# Patient Record
Sex: MALE | Race: WHITE | NOT HISPANIC OR LATINO | Employment: STUDENT | URBAN - METROPOLITAN AREA
[De-identification: names, ages, dates, MRNs, and addresses within clinical notes are randomized per-mention and may not be internally consistent; named-entity substitution may affect disease eponyms.]

---
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
## 2020-07-06 ENCOUNTER — TELEPHONE (OUTPATIENT)
Dept: FAMILY MEDICINE CLINIC | Facility: CLINIC | Age: 20
End: 2020-07-06

## 2020-07-06 NOTE — TELEPHONE ENCOUNTER
PATIENT WOULD LIKE TO KNOW HIS BLOOD TYPE OR FIND OUT HOW TO GET IT. PLEASE CALL HIM BACK @  [phone]

## 2021-05-24 ENCOUNTER — OFFICE VISIT (OUTPATIENT)
Dept: FAMILY MEDICINE CLINIC | Facility: CLINIC | Age: 21
End: 2021-05-24

## 2021-05-24 VITALS
SYSTOLIC BLOOD PRESSURE: 115 MMHG | HEIGHT: 73 IN | HEART RATE: 68 BPM | BODY MASS INDEX: 19.32 KG/M2 | TEMPERATURE: 97.2 F | RESPIRATION RATE: 20 BRPM | DIASTOLIC BLOOD PRESSURE: 70 MMHG | WEIGHT: 145.8 LBS

## 2021-05-24 DIAGNOSIS — Z13.6 ENCOUNTER FOR LIPID SCREENING FOR CARDIOVASCULAR DISEASE: ICD-10-CM

## 2021-05-24 DIAGNOSIS — Z13.220 ENCOUNTER FOR LIPID SCREENING FOR CARDIOVASCULAR DISEASE: ICD-10-CM

## 2021-05-24 DIAGNOSIS — Z11.59 NEED FOR HEPATITIS C SCREENING TEST: ICD-10-CM

## 2021-05-24 DIAGNOSIS — H61.23 BILATERAL IMPACTED CERUMEN: ICD-10-CM

## 2021-05-24 DIAGNOSIS — Z00.00 ENCOUNTER FOR WELL ADULT EXAM WITHOUT ABNORMAL FINDINGS: Primary | ICD-10-CM

## 2021-05-24 DIAGNOSIS — Z23 NEED FOR TETANUS BOOSTER: ICD-10-CM

## 2021-05-24 PROCEDURE — 99395 PREV VISIT EST AGE 18-39: CPT | Performed by: PHYSICIAN ASSISTANT

## 2021-05-24 PROCEDURE — 69209 REMOVE IMPACTED EAR WAX UNI: CPT | Performed by: PHYSICIAN ASSISTANT

## 2021-05-24 PROCEDURE — 90471 IMMUNIZATION ADMIN: CPT | Performed by: PHYSICIAN ASSISTANT

## 2021-05-24 PROCEDURE — 90715 TDAP VACCINE 7 YRS/> IM: CPT | Performed by: PHYSICIAN ASSISTANT

## 2021-05-24 NOTE — PROGRESS NOTES
Chief Complaint   Patient presents with   • Annual Exam     NOTE: pt is NOT fasting today        Subjective   The patient is a 20 y.o. male who presents for annual exam      Nutrition:  well balanced.   Exercise:  walking, PT right now for rehab from knee injury. See below.    Sleep:  getting 8 hours per night     Past Medical History,Medications, Allergies reviewed.     Eye exam UTD   Dental exams are UTD     HPI   Torn R meniscus (medial)  Injury occurred at [employer] practice   Doing PT right now   Had surgery  Dr. Gomez Zacarias in [place]   3 weeks left of season when injury occurred   Pain doing well. Not needing any pain medication   Able to drive   Has one more year of school and being on team     Covid vaccinations completed    3/30 and second 4/20     Due for Tdap today     Wanting to know blood type. Discussed option to have at our office, however usually not covered with insurance. Pt notes he will just donate blood and find out     Father has thyroid issues. Would like to come back for fasting labs later this week     Review of Systems   Constitutional: Negative.  Negative for chills, diaphoresis, fatigue and fever.   HENT: Negative.  Negative for congestion, ear discharge, ear pain, hearing loss, nosebleeds, postnasal drip, sinus pressure, sneezing and sore throat.    Eyes: Negative.    Respiratory: Negative.  Negative for cough, chest tightness, shortness of breath and wheezing.    Cardiovascular: Negative.  Negative for chest pain, palpitations and leg swelling.   Gastrointestinal: Negative for abdominal distention, abdominal pain, blood in stool, constipation, diarrhea, nausea and vomiting.   Genitourinary: Negative.  Negative for difficulty urinating, dysuria, flank pain, frequency, hematuria and urgency.   Musculoskeletal:        As noted per HPI   Skin: Negative.  Negative for color change, pallor, rash and wound.   Neurological: Negative for dizziness, syncope, weakness, light-headedness,  "numbness and headaches.       Objective     /70   Pulse 68   Temp 97.2 °F (36.2 °C)   Resp 20   Ht 185.4 cm (73\")   Wt 66.1 kg (145 lb 12.8 oz)   BMI 19.24 kg/m²     Physical Exam  Vitals and nursing note reviewed.   Constitutional:       Appearance: Normal appearance. He is well-developed.   HENT:      Head: Normocephalic and atraumatic.      Right Ear: Tympanic membrane, ear canal and external ear normal.      Left Ear: Tympanic membrane, ear canal and external ear normal.      Nose: Nose normal.      Mouth/Throat:      Pharynx: No oropharyngeal exudate.   Eyes:      Conjunctiva/sclera: Conjunctivae normal.   Neck:      Thyroid: No thyromegaly.      Trachea: No tracheal deviation.   Cardiovascular:      Rate and Rhythm: Normal rate and regular rhythm.      Heart sounds: Normal heart sounds.   Pulmonary:      Effort: Pulmonary effort is normal. No respiratory distress.      Breath sounds: Normal breath sounds. No wheezing or rales.   Chest:      Chest wall: No tenderness.   Abdominal:      General: Bowel sounds are normal. There is no distension.      Palpations: Abdomen is soft. There is no mass.      Tenderness: There is no abdominal tenderness. There is no guarding or rebound.      Hernia: No hernia is present.   Musculoskeletal:      Cervical back: Normal range of motion and neck supple.      Comments: Right knee in brace    Lymphadenopathy:      Cervical: No cervical adenopathy.   Skin:     General: Skin is warm and dry.   Neurological:      Mental Status: He is alert and oriented to person, place, and time.   Psychiatric:         Behavior: Behavior normal.         Thought Content: Thought content normal.         Judgment: Judgment normal.       Ear Cerumen Removal    Date/Time: 5/24/2021 8:28 AM  Performed by: Juan Hills PA  Authorized by: Juan Hills PA   Consent: Verbal consent obtained.  Risks and benefits: risks, benefits and alternatives were discussed  Consent given by: " patient  Patient understanding: patient states understanding of the procedure being performed  Patient consent: the patient's understanding of the procedure matches consent given  Procedure consent: procedure consent matches procedure scheduled  Location: both ears.  Patient tolerance: Patient tolerated the procedure well with no immediate complications  Procedure type: irrigation   Sedation:  Patient sedated: no              Assessment/Plan     1. Encounter for well adult exam without abnormal findings  - Tdap Vaccine Greater Than or Equal To 6yo IM  - CBC w AUTO Differential  - Comprehensive metabolic panel  - TSH  - Lipid Panel  - Hepatitis C antibody    2. Need for tetanus booster  - Tdap Vaccine Greater Than or Equal To 6yo IM    3. Encounter for lipid screening for cardiovascular disease  - Lipid Panel    4. Need for hepatitis C screening test  - Hepatitis C antibody    5. Bilateral impacted cerumen  - Ear Cerumen Removal    Successful irrigation of ears bilaterally. Pt tolerated well   TDAP updated with patient's consent   Return for fasting labs    Counseling was given to patient for the following topics: instructions for management, risk factor reductions, patient and family education and impressions . Total time of the encounter was 30 minutes and 10 minutes was spent counseling.      CECILIA Barrera

## 2021-05-26 ENCOUNTER — LAB (OUTPATIENT)
Dept: FAMILY MEDICINE CLINIC | Facility: CLINIC | Age: 21
End: 2021-05-26

## 2021-05-27 LAB
ALBUMIN SERPL-MCNC: 4.7 G/DL (ref 3.5–5.2)
ALBUMIN/GLOB SERPL: 2.6 G/DL
ALP SERPL-CCNC: 80 U/L (ref 39–117)
ALT SERPL-CCNC: 17 U/L (ref 1–41)
AST SERPL-CCNC: 15 U/L (ref 1–40)
BASOPHILS # BLD AUTO: 0.03 10*3/MM3 (ref 0–0.2)
BASOPHILS NFR BLD AUTO: 0.6 % (ref 0–1.5)
BILIRUB SERPL-MCNC: 0.5 MG/DL (ref 0–1.2)
BUN SERPL-MCNC: 14 MG/DL (ref 6–20)
BUN/CREAT SERPL: 16.7 (ref 7–25)
CALCIUM SERPL-MCNC: 10.1 MG/DL (ref 8.6–10.5)
CHLORIDE SERPL-SCNC: 101 MMOL/L (ref 98–107)
CHOLEST SERPL-MCNC: 170 MG/DL (ref 0–200)
CO2 SERPL-SCNC: 28.4 MMOL/L (ref 22–29)
CREAT SERPL-MCNC: 0.84 MG/DL (ref 0.76–1.27)
EOSINOPHIL # BLD AUTO: 0.21 10*3/MM3 (ref 0–0.4)
EOSINOPHIL NFR BLD AUTO: 4.5 % (ref 0.3–6.2)
ERYTHROCYTE [DISTWIDTH] IN BLOOD BY AUTOMATED COUNT: 12.6 % (ref 12.3–15.4)
GLOBULIN SER CALC-MCNC: 1.8 GM/DL
GLUCOSE SERPL-MCNC: 76 MG/DL (ref 65–99)
HCT VFR BLD AUTO: 49.9 % (ref 37.5–51)
HCV AB S/CO SERPL IA: <0.1 S/CO RATIO (ref 0–0.9)
HDLC SERPL-MCNC: 40 MG/DL (ref 40–60)
HGB BLD-MCNC: 16.4 G/DL (ref 13–17.7)
IMM GRANULOCYTES # BLD AUTO: 0 10*3/MM3 (ref 0–0.05)
IMM GRANULOCYTES NFR BLD AUTO: 0 % (ref 0–0.5)
LDLC SERPL CALC-MCNC: 110 MG/DL (ref 0–100)
LYMPHOCYTES # BLD AUTO: 1.62 10*3/MM3 (ref 0.7–3.1)
LYMPHOCYTES NFR BLD AUTO: 34.5 % (ref 19.6–45.3)
MCH RBC QN AUTO: 29.4 PG (ref 26.6–33)
MCHC RBC AUTO-ENTMCNC: 32.9 G/DL (ref 31.5–35.7)
MCV RBC AUTO: 89.6 FL (ref 79–97)
MONOCYTES # BLD AUTO: 0.35 10*3/MM3 (ref 0.1–0.9)
MONOCYTES NFR BLD AUTO: 7.5 % (ref 5–12)
NEUTROPHILS # BLD AUTO: 2.48 10*3/MM3 (ref 1.7–7)
NEUTROPHILS NFR BLD AUTO: 52.9 % (ref 42.7–76)
NRBC BLD AUTO-RTO: 0 /100 WBC (ref 0–0.2)
PLATELET # BLD AUTO: 188 10*3/MM3 (ref 140–450)
POTASSIUM SERPL-SCNC: 4.2 MMOL/L (ref 3.5–5.2)
PROT SERPL-MCNC: 6.5 G/DL (ref 6–8.5)
RBC # BLD AUTO: 5.57 10*6/MM3 (ref 4.14–5.8)
SODIUM SERPL-SCNC: 141 MMOL/L (ref 136–145)
TRIGL SERPL-MCNC: 111 MG/DL (ref 0–150)
TSH SERPL DL<=0.005 MIU/L-ACNC: 2.77 UIU/ML (ref 0.27–4.2)
VLDLC SERPL CALC-MCNC: 20 MG/DL (ref 5–40)
WBC # BLD AUTO: 4.69 10*3/MM3 (ref 3.4–10.8)

## 2021-06-10 ENCOUNTER — TELEPHONE (OUTPATIENT)
Dept: FAMILY MEDICINE CLINIC | Facility: CLINIC | Age: 21
End: 2021-06-10

## 2021-06-10 NOTE — TELEPHONE ENCOUNTER
Caller: Lane Urbano    Relationship: Self    Best call back number: [phone]    What form or medical record are you requesting: IMMUNIZATION RECORDS    Who is requesting this form or medical record from you: PATIENT    How would you like to receive the form or medical records (pick-up, mail, fax): Submittable PORTAL PREFERABLY.  If fax, what is the fax number:   If mail, what is the address:  If pick-up, provide patient with address and location details    Timeframe paperwork needed: AS SOON AS POSSIBLE.      Additional notes: PATIENT STATED HE IS NEEDING A COPY OF HIS IMMUNIZATION RECORDS VOLUNTEER OBSERVATION AT [facility].    PATIENT REQUESTING IF IT IS POSSIBLE TO RECEIVE A COPY THROUGH HIS Submittable PORTAL, IF UNABLE TO DO SO PATIENT WOULD BE AVAILABLE TO  AT CLINIC TOMORROW, 6/11.

## 2021-06-11 DIAGNOSIS — Z23 NEED FOR TUBERCULOSIS VACCINATION: Primary | ICD-10-CM

## 2021-06-11 PROCEDURE — 86580 TB INTRADERMAL TEST: CPT | Performed by: PHYSICIAN ASSISTANT

## 2021-06-14 LAB
INDURATION: 0 MM (ref 0–10)
Lab: NORMAL
Lab: NORMAL
TB SKIN TEST: NEGATIVE

## 2022-05-25 ENCOUNTER — TELEPHONE (OUTPATIENT)
Dept: FAMILY MEDICINE CLINIC | Facility: CLINIC | Age: 22
End: 2022-05-25

## 2022-05-25 ENCOUNTER — OFFICE VISIT (OUTPATIENT)
Dept: FAMILY MEDICINE CLINIC | Facility: CLINIC | Age: 22
End: 2022-05-25

## 2022-05-25 VITALS
HEART RATE: 58 BPM | OXYGEN SATURATION: 99 % | SYSTOLIC BLOOD PRESSURE: 98 MMHG | DIASTOLIC BLOOD PRESSURE: 66 MMHG | WEIGHT: 147.5 LBS | TEMPERATURE: 97.5 F | RESPIRATION RATE: 18 BRPM | HEIGHT: 73 IN | BODY MASS INDEX: 19.55 KG/M2

## 2022-05-25 DIAGNOSIS — E55.9 VITAMIN D DEFICIENCY: ICD-10-CM

## 2022-05-25 DIAGNOSIS — Z11.1 SCREENING FOR TUBERCULOSIS: ICD-10-CM

## 2022-05-25 DIAGNOSIS — Z13.6 ENCOUNTER FOR LIPID SCREENING FOR CARDIOVASCULAR DISEASE: ICD-10-CM

## 2022-05-25 DIAGNOSIS — Z00.00 ENCOUNTER FOR WELL ADULT EXAM WITHOUT ABNORMAL FINDINGS: Primary | ICD-10-CM

## 2022-05-25 DIAGNOSIS — Z13.220 ENCOUNTER FOR LIPID SCREENING FOR CARDIOVASCULAR DISEASE: ICD-10-CM

## 2022-05-25 DIAGNOSIS — Z01.84 IMMUNITY STATUS TESTING: ICD-10-CM

## 2022-05-25 PROCEDURE — 99395 PREV VISIT EST AGE 18-39: CPT | Performed by: PHYSICIAN ASSISTANT

## 2022-05-25 NOTE — PROGRESS NOTES
"Chief Complaint   Patient presents with   • Annual Exam     [place]       Subjective   The patient is a 21 y.o. male who presents for annual exam      Nutrition:  well balanced   Exercise:  regular       Past Medical History,Medications, Allergies reviewed.     HPI  Pt presents for annual exam   Needs labs to check immunity status for Hep B, varicella, and MMR. Also needs screening for Hep C   Plans to attend a one year masters program in [place].   Pt is excited for this opportunity     Review of Systems   Constitutional: Negative.  Negative for chills, diaphoresis, fatigue and fever.   HENT: Negative.  Negative for congestion, ear discharge, ear pain, hearing loss, nosebleeds, postnasal drip, sinus pressure, sneezing and sore throat.    Eyes: Negative.    Respiratory: Negative.  Negative for cough, chest tightness, shortness of breath and wheezing.    Cardiovascular: Negative.  Negative for chest pain, palpitations and leg swelling.   Gastrointestinal: Negative for abdominal distention, abdominal pain, blood in stool, constipation, diarrhea, nausea and vomiting.   Genitourinary: Negative.  Negative for difficulty urinating, dysuria, flank pain, frequency, hematuria and urgency.   Musculoskeletal: Negative.  Negative for arthralgias, back pain, gait problem, joint swelling, myalgias, neck pain and neck stiffness.   Skin: Negative.  Negative for color change, pallor, rash and wound.   Neurological: Negative for dizziness, syncope, weakness, light-headedness, numbness and headaches.       Objective     BP 98/66   Pulse 58   Temp 97.5 °F (36.4 °C)   Resp 18   Ht 185.4 cm (73\")   Wt 66.9 kg (147 lb 8 oz)   SpO2 99%   BMI 19.46 kg/m²     Physical Exam  Vitals and nursing note reviewed.   Constitutional:       Appearance: Normal appearance. He is well-developed.   HENT:      Head: Normocephalic and atraumatic.      Right Ear: Tympanic membrane, ear canal and external ear normal. "      Left Ear: Tympanic membrane, ear canal and external ear normal.      Nose: Nose normal.      Mouth/Throat:      Pharynx: No oropharyngeal exudate.   Eyes:      Conjunctiva/sclera: Conjunctivae normal.   Neck:      Thyroid: No thyromegaly.      Trachea: No tracheal deviation.   Cardiovascular:      Rate and Rhythm: Normal rate and regular rhythm.      Heart sounds: Normal heart sounds.   Pulmonary:      Effort: Pulmonary effort is normal. No respiratory distress.      Breath sounds: Normal breath sounds. No wheezing or rales.   Chest:      Chest wall: No tenderness.   Abdominal:      General: Bowel sounds are normal. There is no distension.      Palpations: Abdomen is soft. There is no mass.      Tenderness: There is no abdominal tenderness. There is no guarding or rebound.      Hernia: No hernia is present.   Musculoskeletal:      Cervical back: Normal range of motion and neck supple.   Lymphadenopathy:      Cervical: No cervical adenopathy.   Skin:     General: Skin is warm and dry.   Neurological:      Mental Status: He is alert and oriented to person, place, and time.   Psychiatric:         Mood and Affect: Mood normal.         Behavior: Behavior normal.         Thought Content: Thought content normal.         Judgment: Judgment normal.         Assessment & Plan     1. Encounter for well adult exam without abnormal findings  - Hepatitis B surface antigen  - Hepatitis B Core Antibody, Total  - Hepatitis C antibody  - Hepatitis B Core Antibody, IgM  - QuantiFERON TB Gold  - Varicella Zoster Antibodies, IgG / IgM  - Measles / Mumps / Rubella Immunity  - CBC w AUTO Differential  - Comprehensive metabolic panel  - Lipid Panel  - TSH  - Vitamin D 25 hydroxy    2. Immunity status testing  - Hepatitis B surface antigen  - Hepatitis B Core Antibody, Total  - Hepatitis C antibody  - Hepatitis B Core Antibody, IgM  - Varicella Zoster Antibodies, IgG / IgM  - Measles / Mumps / Rubella Immunity  - Hepatitis B Surface  Antibody    3. Screening for tuberculosis  - QuantiFERON TB Gold    4. Vitamin D deficiency  - Vitamin D 25 hydroxy    5. Encounter for lipid screening for cardiovascular disease  - Lipid Panel      Labs as outlined in plan per [facility] requirements.   Pt will contact insurance before returning for labs to check on expense.   Updated immunization certificate provided     Counseling was given to patient for the following topics: instructions for management, risk factor reductions, patient and family education and impressions . Total time of the encounter was 20 minutes and 10 minutes was spent counseling.    CECILIA López

## 2022-05-25 NOTE — TELEPHONE ENCOUNTER
Caller: Lane Urbano    Relationship: Self    Best call back number: [phone]    What is the best time to reach you:     Who are you requesting to speak with (clinical staff, provider,  specific staff member):     Do you know the name of the person who called:     What was the call regarding:  PT WILL LIKE THE PROCEDURE CODES FOR TEST THAT MRS. GRAY WILL ORDER SO THAT HE CAN CHECK WITH INSURANCE TO MAKE SURE IT IS COVERED    Do you require a callback:

## 2022-06-07 ENCOUNTER — TELEPHONE (OUTPATIENT)
Dept: FAMILY MEDICINE CLINIC | Facility: CLINIC | Age: 22
End: 2022-06-07

## 2022-06-07 NOTE — TELEPHONE ENCOUNTER
Please call patient and see what he is deciding to do about labs required by his study abroad program. Still have his forms in my office.

## 2022-06-18 LAB
25(OH)D3+25(OH)D2 SERPL-MCNC: 27.3 NG/ML (ref 30–100)
ALBUMIN SERPL-MCNC: 4.6 G/DL (ref 4.1–5.2)
ALBUMIN/GLOB SERPL: 1.9 {RATIO} (ref 1.2–2.2)
ALP SERPL-CCNC: 73 IU/L (ref 44–121)
ALT SERPL-CCNC: 17 IU/L (ref 0–44)
AST SERPL-CCNC: 21 IU/L (ref 0–40)
BASOPHILS # BLD AUTO: 0 X10E3/UL (ref 0–0.2)
BASOPHILS NFR BLD AUTO: 1 %
BILIRUB SERPL-MCNC: 0.9 MG/DL (ref 0–1.2)
BUN SERPL-MCNC: 14 MG/DL (ref 6–20)
BUN/CREAT SERPL: 12 (ref 9–20)
CALCIUM SERPL-MCNC: 9.8 MG/DL (ref 8.7–10.2)
CHLORIDE SERPL-SCNC: 100 MMOL/L (ref 96–106)
CHOLEST SERPL-MCNC: 159 MG/DL (ref 100–199)
CO2 SERPL-SCNC: 23 MMOL/L (ref 20–29)
CREAT SERPL-MCNC: 1.13 MG/DL (ref 0.76–1.27)
EGFRCR SERPLBLD CKD-EPI 2021: 94 ML/MIN/1.73
EOSINOPHIL # BLD AUTO: 0.1 X10E3/UL (ref 0–0.4)
EOSINOPHIL NFR BLD AUTO: 4 %
ERYTHROCYTE [DISTWIDTH] IN BLOOD BY AUTOMATED COUNT: 12.7 % (ref 11.6–15.4)
GAMMA INTERFERON BACKGROUND BLD IA-ACNC: 0.38 IU/ML
GLOBULIN SER CALC-MCNC: 2.4 G/DL (ref 1.5–4.5)
GLUCOSE SERPL-MCNC: 85 MG/DL (ref 65–99)
HBV CORE AB SERPL QL IA: NEGATIVE
HBV CORE IGM SERPL QL IA: NEGATIVE
HBV SURFACE AG SERPL QL IA: NEGATIVE
HCT VFR BLD AUTO: 50.9 % (ref 37.5–51)
HCV AB S/CO SERPL IA: 0.2 S/CO RATIO (ref 0–0.9)
HDLC SERPL-MCNC: 44 MG/DL
HGB BLD-MCNC: 17.2 G/DL (ref 13–17.7)
IMM GRANULOCYTES # BLD AUTO: 0 X10E3/UL (ref 0–0.1)
IMM GRANULOCYTES NFR BLD AUTO: 0 %
LDLC SERPL CALC-MCNC: 102 MG/DL (ref 0–99)
LYMPHOCYTES # BLD AUTO: 1.4 X10E3/UL (ref 0.7–3.1)
LYMPHOCYTES NFR BLD AUTO: 38 %
M TB IFN-G BLD-IMP: NEGATIVE
M TB IFN-G CD4+ BCKGRND COR BLD-ACNC: 0.04 IU/ML
M TB IFN-G CD4+CD8+ BCKGRND COR BLD-ACNC: 0.03 IU/ML
MCH RBC QN AUTO: 30.4 PG (ref 26.6–33)
MCHC RBC AUTO-ENTMCNC: 33.8 G/DL (ref 31.5–35.7)
MCV RBC AUTO: 90 FL (ref 79–97)
MEV IGG SER IA-ACNC: 241 AU/ML
MITOGEN IGNF BLD-ACNC: >10 IU/ML
MONOCYTES # BLD AUTO: 0.3 X10E3/UL (ref 0.1–0.9)
MONOCYTES NFR BLD AUTO: 8 %
MUV IGG SER IA-ACNC: 78.5 AU/ML
NEUTROPHILS # BLD AUTO: 1.9 X10E3/UL (ref 1.4–7)
NEUTROPHILS NFR BLD AUTO: 49 %
PLATELET # BLD AUTO: 185 X10E3/UL (ref 150–450)
POTASSIUM SERPL-SCNC: 4.6 MMOL/L (ref 3.5–5.2)
PROT SERPL-MCNC: 7 G/DL (ref 6–8.5)
QUANTIFERON INCUBATION: NORMAL
RBC # BLD AUTO: 5.65 X10E6/UL (ref 4.14–5.8)
RUBV IGG SERPL IA-ACNC: 2.88 INDEX
SERVICE CMNT-IMP: NORMAL
SODIUM SERPL-SCNC: 140 MMOL/L (ref 134–144)
TRIGL SERPL-MCNC: 68 MG/DL (ref 0–149)
TSH SERPL DL<=0.005 MIU/L-ACNC: 1.99 UIU/ML (ref 0.45–4.5)
VLDLC SERPL CALC-MCNC: 13 MG/DL (ref 5–40)
VZV IGG SER IA-ACNC: 301 INDEX
VZV IGM SER IA-ACNC: <0.91 INDEX (ref 0–0.9)
WBC # BLD AUTO: 3.7 X10E3/UL (ref 3.4–10.8)

## 2022-06-21 LAB
HBV SURFACE AB SER QL: NON REACTIVE
Lab: NORMAL
WRITTEN AUTHORIZATION: NORMAL

## 2022-06-22 DIAGNOSIS — Z01.84 LACK OF IMMUNITY TO HEPATITIS B VIRUS DEMONSTRATED BY SEROLOGIC TEST: Primary | ICD-10-CM

## 2022-07-26 ENCOUNTER — TELEPHONE (OUTPATIENT)
Dept: FAMILY MEDICINE CLINIC | Facility: CLINIC | Age: 22
End: 2022-07-26

## 2022-07-27 ENCOUNTER — CLINICAL SUPPORT (OUTPATIENT)
Dept: FAMILY MEDICINE CLINIC | Facility: CLINIC | Age: 22
End: 2022-07-27

## 2022-07-27 DIAGNOSIS — Z23 NEED FOR HEPATITIS B BOOSTER VACCINATION: Primary | ICD-10-CM

## 2023-07-27 ENCOUNTER — OFFICE VISIT (OUTPATIENT)
Dept: FAMILY MEDICINE CLINIC | Facility: CLINIC | Age: 23
End: 2023-07-27
Payer: COMMERCIAL

## 2023-07-27 VITALS
DIASTOLIC BLOOD PRESSURE: 64 MMHG | HEART RATE: 67 BPM | TEMPERATURE: 97.7 F | OXYGEN SATURATION: 98 % | WEIGHT: 147 LBS | SYSTOLIC BLOOD PRESSURE: 112 MMHG | HEIGHT: 73 IN | RESPIRATION RATE: 16 BRPM | BODY MASS INDEX: 19.48 KG/M2

## 2023-07-27 DIAGNOSIS — Z00.00 ENCOUNTER FOR WELL ADULT EXAM WITHOUT ABNORMAL FINDINGS: Primary | ICD-10-CM

## 2023-07-27 DIAGNOSIS — D22.9 SUSPICIOUS NEVUS: ICD-10-CM

## 2023-07-27 PROCEDURE — 99395 PREV VISIT EST AGE 18-39: CPT | Performed by: PHYSICIAN ASSISTANT

## 2023-07-27 NOTE — PROGRESS NOTES
"Chief Complaint   Patient presents with    Annual Exam     Physical-spot on back       Subjective   The patient is a 22 y.o. male who presents for annual exam      Nutrition:  well balanced. More plant based diet now. 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 Stays active. Enjoyed traveling around to different areas with team   Sleep:  doing well no concerns     Past Medical History,Medications, Allergies reviewed.     HPI  Pt presents for annual exam   Recently got back from one year master's program in [place]. This was a great experience for patient   Plans to go back for a couple weeks to finish up everything with thesis and then move back to [place]    Monkey pox vaccination completed in [place].     PrEP therapy -Event based dosing while overseas. Interested in possibly doing this again when her fully moves back to [place]  History of male sexual partner(s) and uses protection with condoms   Denies any STD testing at this time but in the future would like to proceed with screening every three months.     Dental appointment and eye exam scheduled     Has irregular mole on back he would like checked. Does not have established dermatologist     Objective     /64   Pulse 67   Temp 97.7 °F (36.5 °C)   Resp 16   Ht 185.4 cm (73\")   Wt 66.7 kg (147 lb)   SpO2 98%   BMI 19.39 kg/m²     Physical Exam  Vitals and nursing note reviewed.   Constitutional:       Appearance: Normal appearance.   HENT:      Head: Normocephalic.      Right Ear: Tympanic membrane, ear canal and external ear normal.      Left Ear: Tympanic membrane, ear canal and external ear normal.      Nose: Nose normal.      Mouth/Throat:      Pharynx: No oropharyngeal exudate.   Eyes:      Conjunctiva/sclera: Conjunctivae normal.   Cardiovascular:      Rate and Rhythm: Normal rate and regular rhythm.   Pulmonary:      Effort: Pulmonary effort is normal.      Breath sounds: Normal breath " sounds.   Abdominal:      General: Abdomen is flat. Bowel sounds are normal.      Palpations: Abdomen is soft. There is no mass.      Tenderness: There is no abdominal tenderness. There is no guarding.   Skin:     General: Skin is warm and dry.   Neurological:      General: No focal deficit present.      Mental Status: He is alert and oriented to person, place, and time.   Psychiatric:         Mood and Affect: Mood normal.         Behavior: Behavior normal.     Large irregular pigmented lesion of L upper back as imaged above     Assessment & Plan     1. Encounter for well adult exam without abnormal findings    2. Suspicious nevus  - Ambulatory Referral to Dermatology    Referral to dermatology for suspicious nevus   Pt will notify office when wanting to start back with PrEP therapy and will place referral to ID   Denies STD screening at this time. Will obtain when he goes back to [place] to obtain for free. [facility] would be the most affordable option to obtain screening on a 3 month basis.     Counseling was given to patient for the following topics: instructions for management, risk factor reductions, patient and family education, and impressions . Total time of the encounter was 15 minutes and 7 minutes was spent counseling.      CECILIA López

## 2023-09-01 ENCOUNTER — TELEPHONE (OUTPATIENT)
Dept: FAMILY MEDICINE CLINIC | Facility: CLINIC | Age: 23
End: 2023-09-01
Payer: COMMERCIAL

## 2023-09-01 NOTE — TELEPHONE ENCOUNTER
Caller: Lane Urbano    Relationship: Self    Best call back number: [phone]      What test was performed:STI    When was the test performed: 2 WEEKS AGO    Where was the test performed: [facility]

## 2023-09-06 ENCOUNTER — OFFICE VISIT (OUTPATIENT)
Dept: FAMILY MEDICINE CLINIC | Facility: CLINIC | Age: 23
End: 2023-09-06
Payer: COMMERCIAL

## 2023-09-06 VITALS
OXYGEN SATURATION: 99 % | TEMPERATURE: 98.6 F | DIASTOLIC BLOOD PRESSURE: 66 MMHG | BODY MASS INDEX: 19.06 KG/M2 | HEIGHT: 73 IN | HEART RATE: 71 BPM | SYSTOLIC BLOOD PRESSURE: 96 MMHG | WEIGHT: 143.8 LBS | RESPIRATION RATE: 16 BRPM

## 2023-09-06 DIAGNOSIS — A54.9 GONORRHEA: Primary | ICD-10-CM

## 2023-09-06 RX ORDER — CEFTRIAXONE 500 MG/1
500 INJECTION, POWDER, FOR SOLUTION INTRAMUSCULAR; INTRAVENOUS ONCE
Status: COMPLETED | OUTPATIENT
Start: 2023-09-06 | End: 2023-09-06

## 2023-09-06 RX ORDER — DOXYCYCLINE HYCLATE 100 MG/1
100 CAPSULE ORAL 2 TIMES DAILY
Qty: 20 CAPSULE | Refills: 0 | Status: SHIPPED | OUTPATIENT
Start: 2023-09-06

## 2023-09-06 RX ADMIN — CEFTRIAXONE 500 MG: 500 INJECTION, POWDER, FOR SOLUTION INTRAMUSCULAR; INTRAVENOUS at 11:21

## 2023-09-06 NOTE — PROGRESS NOTES
"Subjective   Lane Urbano is a 22 y.o. male.     History of Present Illness   Pt received STD screening while in [place]   Recently tested positive for rectal gonorrhea. Has been having symptoms of rectal discomfort, trouble initiating BM, diarrhea and mild blood when wiping   Suspected contact was a week prior, did use condoms   All other STD testing was negative   Called health department but they could not get him in for a week   No fever, chills or urinary symptoms     The following portions of the patient's history were reviewed and updated as appropriate: allergies, current medications, past family history, past medical history, past social history, past surgical history, and problem list.    Review of Systems  As noted per HPI     Objective   Blood pressure 96/66, pulse 71, temperature 98.6 °F (37 °C), resp. rate 16, height 185.4 cm (73\"), weight 65.2 kg (143 lb 12.8 oz), SpO2 99 %.   Physical Exam  Vitals reviewed.   Cardiovascular:      Rate and Rhythm: Normal rate.   Pulmonary:      Effort: Pulmonary effort is normal.   Neurological:      Mental Status: He is alert.   Psychiatric:         Mood and Affect: Mood normal.         Behavior: Behavior normal.       Assessment & Plan   Diagnoses and all orders for this visit:    1. Gonorrhea (Primary)  -     cefTRIAXone (ROCEPHIN) injection 500 mg  -     doxycycline (VIBRAMYCIN) 100 MG capsule; Take 1 capsule by mouth 2 (Two) Times a Day.  Dispense: 20 capsule; Refill: 0    Rocephin administered in office with patient's consent to treat for gonorrhea infection. Monitored X 15 minutes without reaction   Will also cover with oral doxycycline as noted.   Encourage retesting at health department 2 weeks after completing treatment   Abstain from sexual intercourse until confirmed infection has cleared. Pt voiced understanding              "

## 2023-12-19 DIAGNOSIS — K62.89 ANAL INFECTION: Primary | ICD-10-CM

## 2024-03-27 ENCOUNTER — OFFICE VISIT (OUTPATIENT)
Dept: FAMILY MEDICINE CLINIC | Facility: CLINIC | Age: 24
End: 2024-03-27
Payer: COMMERCIAL

## 2024-03-27 VITALS
DIASTOLIC BLOOD PRESSURE: 64 MMHG | TEMPERATURE: 98.4 F | SYSTOLIC BLOOD PRESSURE: 108 MMHG | HEIGHT: 73 IN | WEIGHT: 150.4 LBS | RESPIRATION RATE: 15 BRPM | BODY MASS INDEX: 19.93 KG/M2 | OXYGEN SATURATION: 99 % | HEART RATE: 75 BPM

## 2024-03-27 DIAGNOSIS — Z72.52 HIGH RISK HOMOSEXUAL BEHAVIOR: ICD-10-CM

## 2024-03-27 DIAGNOSIS — H00.031 CELLULITIS OF RIGHT UPPER EYELID: Primary | ICD-10-CM

## 2024-03-27 PROCEDURE — 99213 OFFICE O/P EST LOW 20 MIN: CPT | Performed by: PHYSICIAN ASSISTANT

## 2024-03-27 RX ORDER — PREDNISONE 10 MG/1
TABLET ORAL
Qty: 21 EACH | Refills: 0 | Status: SHIPPED | OUTPATIENT
Start: 2024-03-27 | End: 2024-04-04

## 2024-03-27 RX ORDER — SULFAMETHOXAZOLE AND TRIMETHOPRIM 800; 160 MG/1; MG/1
1 TABLET ORAL 2 TIMES DAILY
Qty: 14 TABLET | Refills: 0 | Status: SHIPPED | OUTPATIENT
Start: 2024-03-27 | End: 2024-04-04

## 2024-03-27 NOTE — PROGRESS NOTES
"Subjective   Lane Urbano is a 23 y.o. male.   Chief Complaint   Patient presents with    Eye Problem     Rt eye swollen-woke up swollen and has gotten darker in color-no drainage or crustiness-tender to touch      History of Present Illness   Patient woke up with swelling of R upper eyelid   Starting to feel tight and tender   Red in color   No suspected stye and no lump palpated  No change in vision or redness of eye itself   No drainage   No known injury     Patient is requesting referral for PrEP treatment   UTD on STD screening from outside clinic  Homosexual male with male partners   Uses protection   No current symptoms     The following portions of the patient's history were reviewed and updated as appropriate: allergies, current medications, past family history, past medical history, past social history, past surgical history, and problem list.    Review of Systems  As noted per HPI     Objective   Blood pressure 108/64, pulse 75, temperature 98.4 °F (36.9 °C), resp. rate 15, height 185.4 cm (73\"), weight 68.2 kg (150 lb 6.4 oz), SpO2 99%.     Physical Exam  Constitutional:       Appearance: Normal appearance.   HENT:      Head: Normocephalic.      Right Ear: Tympanic membrane, ear canal and external ear normal.      Left Ear: Tympanic membrane, ear canal and external ear normal.      Nose: Nose normal.      Mouth/Throat:      Pharynx: No oropharyngeal exudate or posterior oropharyngeal erythema.   Eyes:      Conjunctiva/sclera: Conjunctivae normal.   Cardiovascular:      Rate and Rhythm: Normal rate and regular rhythm.   Pulmonary:      Effort: Pulmonary effort is normal.      Breath sounds: Normal breath sounds.   Skin:     General: Skin is warm and dry.   Neurological:      Mental Status: He is alert and oriented to person, place, and time.   Psychiatric:         Mood and Affect: Mood normal.         Behavior: Behavior normal.           Erythema and edema of R upper eyelid as imaged above. No active " drainage. Mild TTP. No hordeolum or localized mass palpated     Assessment & Plan   Diagnoses and all orders for this visit:    1. Cellulitis of right upper eyelid (Primary)  -      sulfamethoxazole-trimethoprim (Bactrim DS) 800-160 MG per tablet; Take 1 tablet by mouth 2 (Two) Times a Day. (Patient not taking: Reported on 4/4/2024)  Dispense: 14 tablet; Refill: 0  -     predniSONE (DELTASONE) 10 MG (21) dose pack; Use as directed on package  Dispense: 21 each; Refill: 0    2. High risk homosexual behavior  -     Ambulatory Referral to Infectious Disease      Symptoms appear consistent with eyelid cellulitis   Treatment as noted.   Follow up with eye doctor if not improving     Referral to ID for PrEP therapy per patient request

## 2024-04-04 ENCOUNTER — OFFICE VISIT (OUTPATIENT)
Dept: FAMILY MEDICINE CLINIC | Facility: CLINIC | Age: 24
End: 2024-04-04
Payer: COMMERCIAL

## 2024-04-04 VITALS
DIASTOLIC BLOOD PRESSURE: 68 MMHG | OXYGEN SATURATION: 96 % | HEIGHT: 73 IN | HEART RATE: 69 BPM | RESPIRATION RATE: 18 BRPM | WEIGHT: 145.8 LBS | TEMPERATURE: 97.5 F | BODY MASS INDEX: 19.32 KG/M2 | SYSTOLIC BLOOD PRESSURE: 96 MMHG

## 2024-04-04 DIAGNOSIS — H00.039 CELLULITIS OF EYELID: Primary | ICD-10-CM

## 2024-04-04 DIAGNOSIS — H00.025 HORDEOLUM INTERNUM OF LEFT LOWER EYELID: Primary | ICD-10-CM

## 2024-04-04 PROCEDURE — 99213 OFFICE O/P EST LOW 20 MIN: CPT | Performed by: PHYSICIAN ASSISTANT

## 2024-04-04 RX ORDER — OFLOXACIN 3 MG/ML
1 SOLUTION/ DROPS OPHTHALMIC 4 TIMES DAILY
Qty: 5 ML | Refills: 0 | Status: SHIPPED | OUTPATIENT
Start: 2024-04-04 | End: 2024-04-14

## 2024-04-04 RX ORDER — PREDNISONE 20 MG/1
20 TABLET ORAL 2 TIMES DAILY
Qty: 10 TABLET | Refills: 0 | Status: SHIPPED | OUTPATIENT
Start: 2024-04-04 | End: 2024-04-04

## 2024-04-04 RX ORDER — CEPHALEXIN 500 MG/1
500 CAPSULE ORAL 3 TIMES DAILY
Qty: 30 CAPSULE | Refills: 0 | Status: SHIPPED | OUTPATIENT
Start: 2024-04-04 | End: 2024-04-04

## 2024-04-04 NOTE — PROGRESS NOTES
"Subjective   aLne Urbano is a 23 y.o. male.     History of Present Illness   Patient was seen last week on 3/27 for suspected cellulitis of R upper eyelid. Symptoms resolved with bactrim and steroid taper, but patient notes he woke up yesterday morning with swelling of his L lower eyelid. Area is tender and itching. Has completed bactrim (last dose was yesterday)   Concerned he may be allergic to something. Had eggs the night prior. No previous food sensitives or allergies   Pt is wearing glasses.     The following portions of the patient's history were reviewed and updated as appropriate: allergies, current medications, past family history, past medical history, past social history, past surgical history, and problem list.    Review of Systems  As noted per HPI     Objective   Blood pressure 96/68, pulse 69, temperature 97.5 °F (36.4 °C), resp. rate 18, height 185.4 cm (73\"), weight 66.1 kg (145 lb 12.8 oz), SpO2 96%.     Physical Exam  Blood pressure 96/68, pulse 69, temperature 97.5 °F (36.4 °C), resp. rate 18, height 185.4 cm (73\"), weight 66.1 kg (145 lb 12.8 oz), SpO2 96%.     Subcutaneous swelling of leflt lower eyelid as imaged above     Internal hordeolum of left lower eyelid as imaged above     Assessment & Plan   Diagnoses and all orders for this visit:    1. Hordeolum internum of left lower eyelid (Primary)  -     ofloxacin (Ocuflox) 0.3 % ophthalmic solution; Administer 1 drop into the left eye 4 (Four) Times a Day for 10 days.  Dispense: 5 mL; Refill: 0    Although coincidental in timing, think two eye issues are unrelated. Evidence of internal hordeolum of left lower eye lid on exam. Warm compresses, coverage with ocuflox and may take steroid burst I sent to patient's pharmacy yesterday to help with some of the inflammation   F/u with eye doctor if not improving.   Pt voiced understanding                "

## 2024-07-10 ENCOUNTER — NURSE TRIAGE (OUTPATIENT)
Dept: CALL CENTER | Facility: HOSPITAL | Age: 24
End: 2024-07-10
Payer: COMMERCIAL

## 2024-07-10 ENCOUNTER — OFFICE VISIT (OUTPATIENT)
Dept: FAMILY MEDICINE CLINIC | Facility: CLINIC | Age: 24
End: 2024-07-10
Payer: COMMERCIAL

## 2024-07-10 VITALS
WEIGHT: 147 LBS | HEART RATE: 61 BPM | SYSTOLIC BLOOD PRESSURE: 100 MMHG | HEIGHT: 73 IN | TEMPERATURE: 99.3 F | BODY MASS INDEX: 19.48 KG/M2 | DIASTOLIC BLOOD PRESSURE: 64 MMHG | OXYGEN SATURATION: 98 %

## 2024-07-10 DIAGNOSIS — H00.012 HORDEOLUM EXTERNUM OF RIGHT LOWER EYELID: Primary | ICD-10-CM

## 2024-07-10 PROCEDURE — 99213 OFFICE O/P EST LOW 20 MIN: CPT | Performed by: PHYSICIAN ASSISTANT

## 2024-07-10 RX ORDER — EMTRICITABINE AND TENOFOVIR DISOPROXIL FUMARATE 200; 300 MG/1; MG/1
1 TABLET, FILM COATED ORAL DAILY
COMMUNITY

## 2024-07-10 RX ORDER — PREDNISONE 20 MG/1
20 TABLET ORAL 2 TIMES DAILY
Qty: 10 TABLET | Refills: 0 | Status: SHIPPED | OUTPATIENT
Start: 2024-07-10

## 2024-07-10 NOTE — TELEPHONE ENCOUNTER
Ciprofloxacin ophthalmic ointment not covered by patient's insurance. Pharmacy asking for substitution. Called Dr. Romo, on-call provider. Telephone order: Erythromycin 1/2 inch to affected eye tid. Verified with readback. Called to Sven at pharmacy.    Reason for Disposition   [1] Pharmacy calling with prescription question AND [2] triager unable to answer question    Additional Information   Negative: [1] Intentional drug overdose AND [2] suicidal thoughts or ideas   Negative: Drug overdose and triager unable to answer question   Negative: Caller requesting a renewal or refill of a medicine patient is currently taking   Negative: Caller requesting information unrelated to medicine   Negative: Caller requesting information about COVID-19 Vaccine   Negative: Caller requesting information about Emergency Contraception   Negative: Caller requesting information about Combined Birth Control Pills   Negative: Caller requesting information about Progestin Birth Control Pills   Negative: Caller requesting information about Post-Op pain or medicines   Negative: Caller requesting a prescription antibiotic (such as Penicillin) for Strep throat and has a positive culture result   Negative: Caller requesting a prescription anti-viral med (such as Tamiflu) and has influenza (flu) symptoms   Negative: Immunization reaction suspected   Negative: Rash while taking a medicine or within 3 days of stopping it   Negative: [1] Asthma and [2] having symptoms of asthma (cough, wheezing, etc.)   Negative: [1] Symptom of illness (e.g., headache, abdominal pain, earache, vomiting) AND [2] more than mild   Negative: Breastfeeding questions about mother's medicines and diet   Negative: MORE THAN A DOUBLE DOSE of a prescription or over-the-counter (OTC) drug   Negative: [1] DOUBLE DOSE (an extra dose or lesser amount) of prescription drug AND [2] any symptoms (e.g., dizziness, nausea, pain, sleepiness)   Negative: [1] DOUBLE DOSE (an extra  "dose or lesser amount) of over-the-counter (OTC) drug AND [2] any symptoms (e.g., dizziness, nausea, pain, sleepiness)   Negative: Took another person's prescription drug   Negative: [1] DOUBLE DOSE (an extra dose or lesser amount) of prescription drug AND [2] NO symptoms  (Exception: A double dose of antibiotics.)   Negative: Diabetes drug error or overdose (e.g., took wrong type of insulin or took extra dose)   Negative: [1] Prescription not at pharmacy AND [2] was prescribed by PCP recently (Exception: Triager has access to EMR and prescription is recorded there. Go to Home Care and confirm for pharmacy.)    Answer Assessment - Initial Assessment Questions  1. NAME of MEDICINE: \"What medicine(s) are you calling about?\"      Ciprofloxacin ophthalmic ointment  2. QUESTION: \"What is your question?\" (e.g., double dose of medicine, side effect)      Not covered by insurance, asking for a substitution  3. PRESCRIBER: \"Who prescribed the medicine?\" Reason: if prescribed by specialist, call should be referred to that group.      Juan Waldron  4. SYMPTOMS: \"Do you have any symptoms?\" If Yes, ask: \"What symptoms are you having?\"  \"How bad are the symptoms (e.g., mild, moderate, severe)      Right eye  5. PREGNANCY:  \"Is there any chance that you are pregnant?\" \"When was your last menstrual period?\"      No    Protocols used: Medication Question Call-ADULT-    "

## 2024-07-10 NOTE — PROGRESS NOTES
"Subjective   Lane Urbano is a 23 y.o. male.   Chief Complaint   Patient presents with    Stye     Rt eye on Monday. Worse Tuesday      History of Present Illness   Pt presents with CC of stye of right lower eyelid   Symptoms started Monday   Started worsening on Tuesday   Large, inflamed and tender   Has been doing warm compresses   Has had some recent eye infections he thinks may be secondary to old sport sunglasses he has had since he was 11. Has foam around the edges that may be disintegrating   Wears glasses. UTD on eye exam. Slight change in prescription, everything else looked okay     The following portions of the patient's history were reviewed and updated as appropriate: allergies, current medications, past family history, past medical history, past social history, past surgical history, and problem list.    Review of Systems  As noted per HPI     Objective   Blood pressure 100/64, pulse 61, temperature 99.3 °F (37.4 °C), height 185.4 cm (73\"), weight 66.7 kg (147 lb), SpO2 98%.     Physical Exam  Constitutional:       Appearance: Normal appearance.   Cardiovascular:      Rate and Rhythm: Normal rate and regular rhythm.   Pulmonary:      Effort: Pulmonary effort is normal.      Breath sounds: Normal breath sounds.   Skin:     General: Skin is warm and dry.   Neurological:      Mental Status: He is alert and oriented to person, place, and time.   Psychiatric:         Mood and Affect: Mood normal.         Behavior: Behavior normal.         Large, inflamed hordeolum as imaged above     Assessment & Plan   Diagnoses and all orders for this visit:    1. Hordeolum externum of right lower eyelid (Primary)  -     ciprofloxacin (CILOXAN) 0.3 % ophthalmic ointment; Administer  to the right eye 2 (Two) Times a Day.  Dispense: 3.5 g; Refill: 0  -     predniSONE (DELTASONE) 20 MG tablet; Take 1 tablet by mouth 2 (Two) Times a Day.  Dispense: 10 tablet; Refill: 0    Continue with warm compresses and treatment as " noted above   Follow up with eye doctor if not improving for further management

## 2024-08-26 DIAGNOSIS — H00.025 HORDEOLUM INTERNUM OF LEFT LOWER EYELID: ICD-10-CM

## 2024-08-29 ENCOUNTER — TELEPHONE (OUTPATIENT)
Dept: FAMILY MEDICINE CLINIC | Facility: CLINIC | Age: 24
End: 2024-08-29
Payer: COMMERCIAL

## 2024-08-29 RX ORDER — OFLOXACIN 3 MG/ML
SOLUTION/ DROPS OPHTHALMIC
Qty: 5 ML | Refills: 0 | Status: SHIPPED | OUTPATIENT
Start: 2024-08-29 | End: 2024-08-29

## 2024-08-29 NOTE — TELEPHONE ENCOUNTER
Caller: Lane Urbano    Relationship: Self    Best call back number: [phone]     What is the best time to reach you: ANY    Who are you requesting to speak with (clinical staff, provider,  specific staff member): GREG GRAY OR HER NURSE    What was the call regarding: THE PATIENT HAD GOTTEN A CALL. PATIENT SAID THAT HE DID NOT REQUEST THE MEDICATION THAT WAS FILLED TODAY. CAN SHE SEE WHY THIS WAS REQUESTED BY THE PHARMACY? HE HAS NOT SEEN HER IN OVER A MONTH AND THAT ISSUES HAS NOT COME BACK. PLEASE LOOK INTO THIS AND LET HIM KNOW.    Is it okay if the provider responds through [employer]: NO

## 2024-09-26 ENCOUNTER — OFFICE VISIT (OUTPATIENT)
Dept: FAMILY MEDICINE CLINIC | Facility: CLINIC | Age: 24
End: 2024-09-26
Payer: COMMERCIAL

## 2024-09-26 VITALS
HEART RATE: 70 BPM | BODY MASS INDEX: 19.48 KG/M2 | WEIGHT: 147 LBS | TEMPERATURE: 97.8 F | HEIGHT: 73 IN | DIASTOLIC BLOOD PRESSURE: 60 MMHG | OXYGEN SATURATION: 98 % | SYSTOLIC BLOOD PRESSURE: 112 MMHG

## 2024-09-26 DIAGNOSIS — H92.01 OTALGIA, RIGHT EAR: ICD-10-CM

## 2024-09-26 DIAGNOSIS — K13.79 MOUTH SORE: ICD-10-CM

## 2024-09-26 DIAGNOSIS — J02.9 ACUTE PHARYNGITIS, UNSPECIFIED ETIOLOGY: Primary | ICD-10-CM

## 2024-09-26 LAB
EXPIRATION DATE: NORMAL
INTERNAL CONTROL: NORMAL
Lab: NORMAL
S PYO AG THROAT QL: NEGATIVE

## 2024-09-26 PROCEDURE — 87880 STREP A ASSAY W/OPTIC: CPT | Performed by: PHYSICIAN ASSISTANT

## 2024-09-26 PROCEDURE — 99213 OFFICE O/P EST LOW 20 MIN: CPT | Performed by: PHYSICIAN ASSISTANT

## 2024-09-26 RX ORDER — LIDOCAINE HYDROCHLORIDE 20 MG/ML
5 SOLUTION OROPHARYNGEAL EVERY 4 HOURS PRN
Qty: 100 ML | Refills: 0 | Status: SHIPPED | OUTPATIENT
Start: 2024-09-26

## 2024-09-26 RX ORDER — FLUTICASONE PROPIONATE 50 UG/1
2 SPRAY, METERED NASAL DAILY
Qty: 15.8 ML | Refills: 0 | Status: SHIPPED | OUTPATIENT
Start: 2024-09-26

## 2024-09-26 RX ORDER — DOXYCYCLINE 100 MG/1
100 CAPSULE ORAL 2 TIMES DAILY
Qty: 20 CAPSULE | Refills: 0 | Status: SHIPPED | OUTPATIENT
Start: 2024-09-26

## 2024-09-26 RX ORDER — PREDNISONE 10 MG/1
TABLET ORAL
Qty: 21 EACH | Refills: 0 | Status: SHIPPED | OUTPATIENT
Start: 2024-09-26

## 2024-09-26 NOTE — PROGRESS NOTES
"Subjective   Lane Urbano is a 23 y.o. male.     History of Present Illness   History of Present Illness  The patient presents for evaluation of sore throat.    He has been experiencing a sore throat since Tuesday, accompanied by ear pain. His hearing is unaffected. He has attempted to alleviate the symptoms with Tylenol and allergy medication, but these have not provided relief. He reports no recent exposure to sick individuals or increased stress levels. He is currently under the care of Dr. Davies from Infect[facility] for his PrEP.       The following portions of the patient's history were reviewed and updated as appropriate: allergies, current medications, past family history, past medical history, past social history, past surgical history, and problem list.    Review of Systems  As noted per HPI     Objective   Blood pressure 112/60, pulse 70, temperature 97.8 °F (36.6 °C), height 185.4 cm (73\"), weight 66.7 kg (147 lb), SpO2 98%.   Physical Exam  Constitutional:       Appearance: Normal appearance.   HENT:      Right Ear: Tympanic membrane, ear canal and external ear normal.      Left Ear: Tympanic membrane, ear canal and external ear normal.      Nose: Nose normal.      Mouth/Throat:      Pharynx: Posterior oropharyngeal erythema present. No oropharyngeal exudate.      Comments: Large ulceration of oral mucosa of R posterior pharynx   Cardiovascular:      Rate and Rhythm: Normal rate and regular rhythm.   Pulmonary:      Effort: Pulmonary effort is normal.      Breath sounds: Normal breath sounds.   Skin:     General: Skin is warm and dry.   Neurological:      Mental Status: He is alert and oriented to person, place, and time.         Results  Laboratory Studies  No strep detected.    Assessment & Plan   Assessment & Plan  1. Canker sore.  A significant canker sore was observed at the back of his throat. An antibiotic was prescribed along with a lidocaine mouthwash for gargling to numb the area and " alleviate pain. A culture will be sent for further analysis to identify the specific bacteria or virus causing the symptoms. The patient is advised to keep the provider updated on his condition.    2. Eustachian tube dysfunction.  Fluid was noted in the left ear, and Flonase was recommended to help with the eustachian tube dysfunction. Prednisone was prescribed to reduce inflammation. The patient is advised to start taking prednisone the following day to avoid sleep disturbances.       Diagnoses and all orders for this visit:    1. Acute pharyngitis, unspecified etiology (Primary)  -     POCT rapid strep A  -     doxycycline (VIBRAMYCIN) 100 MG capsule; Take 1 capsule by mouth 2 (Two) Times a Day.  Dispense: 20 capsule; Refill: 0  -     predniSONE (DELTASONE) 10 MG (21) dose pack; Use as directed on package  Dispense: 21 each; Refill: 0  -     Throat / Upper Respiratory Culture - Swab, Throat    2. Mouth sore  -     Lidocaine Viscous HCl (XYLOCAINE) 2 % solution; Take 5 mL by mouth Every 4 (Four) Hours As Needed for Mild Pain or Moderate Pain.  Dispense: 100 mL; Refill: 0  -     Throat / Upper Respiratory Culture - Swab, Throat    3. Otalgia, right ear  -     fluticasone (FLONASE) 50 MCG/ACT nasal spray; Administer 2 sprays into the nostril(s) as directed by provider Daily.  Dispense: 15.8 mL; Refill: 0               Patient or patient representative verbalized consent for the use of Ambient Listening during the visit with  CECILIA López for chart documentation. 10/6/2024  16:10 EDT

## 2024-10-01 LAB
BACTERIA SPEC RESP CULT: NORMAL
BACTERIA SPEC RESP CULT: NORMAL

## 2024-10-23 DIAGNOSIS — H92.01 OTALGIA, RIGHT EAR: ICD-10-CM

## 2024-10-23 RX ORDER — FLUTICASONE PROPIONATE 50 UG/1
2 SPRAY, METERED NASAL DAILY
Qty: 16 G | Refills: 0 | Status: SHIPPED | OUTPATIENT
Start: 2024-10-23

## 2025-05-01 ENCOUNTER — TELEPHONE (OUTPATIENT)
Dept: FAMILY MEDICINE CLINIC | Facility: CLINIC | Age: 25
End: 2025-05-01
Payer: COMMERCIAL

## 2025-05-01 DIAGNOSIS — H00.012 HORDEOLUM EXTERNUM OF RIGHT LOWER EYELID: ICD-10-CM

## 2025-05-01 NOTE — TELEPHONE ENCOUNTER
Caller: Lane Urbano    Relationship: Self    Best call back number: [phone]     What medication are you requesting: SAME MEDICATION AS LAST TIME    What are your current symptoms: SKIN INFECTION UPPER EYE LID ON LEFT EYE    How long have you been experiencing symptoms: STARTED TWO DAYS AGO    Have you had these symptoms before:    [x] Yes  [] No    Have you been treated for these symptoms before:   [x] Yes  [] No    If a prescription is needed, what is your preferred pharmacy and phone number: [id number] [address]  AT [facility] - [phone] [facility] [phone]      Additional notes: PATIENT STATES THAT IT'S ALMOST EXACTLY THE SAME AS LAST YEAR BUT ON THE OTHER EYE.

## 2025-05-08 ENCOUNTER — TELEPHONE (OUTPATIENT)
Dept: FAMILY MEDICINE CLINIC | Facility: CLINIC | Age: 25
End: 2025-05-08
Payer: COMMERCIAL

## 2025-05-08 NOTE — TELEPHONE ENCOUNTER
Pharmacy Name:      Pharmacy representative phone number: [phone]    What medication are you calling in regards to: CIPROFLOXACIN OPHTHALMIC OINTMENT    What question does the pharmacy have: REPRESENTATIVE IS REQUESTING A MODIFICATION DUE TO EXPENSIVE, INSURANCE WILL NOT COVER    Who is the provider that prescribed the medication: GREG GRAYPA

## 2025-05-15 NOTE — TELEPHONE ENCOUNTER
Please call and check on patient's symptoms. Got a notification pharmacy could not fill the eye ointment

## 2025-05-16 NOTE — TELEPHONE ENCOUNTER
Name: Lane Urbano      Relationship: Self      Best Callback Number: [phone]       HUB PROVIDED THE RELAY MESSAGE FROM THE OFFICE      PATIENT:     ADDITIONAL INFORMATION: PATIENT STATES THE ISSUE HAS RESOLVED AND HE NO LONGER NEEDS THIS MEDICATION.

## 2025-07-03 ENCOUNTER — TELEPHONE (OUTPATIENT)
Dept: FAMILY MEDICINE CLINIC | Facility: CLINIC | Age: 25
End: 2025-07-03
Payer: COMMERCIAL

## 2025-07-03 NOTE — TELEPHONE ENCOUNTER
Caller: Lane Urbano    Relationship: Self    Best call back number: [phone]    What form or medical record are you requesting: IMMUNIZATIONS     Who is requesting this form or medical record from you: SELF    How would you like to receive the form or medical records (pick-up, mail, fax): MAIL  If mail, what is the address: [address] [place], [place] [id number]     Additional notes: REQUESTING AN UPDATE TO DATE COPY OFF IMMUNIZATIONS TO SEE WHAT HE HAS GOT